# Patient Record
Sex: MALE | Race: WHITE | NOT HISPANIC OR LATINO
[De-identification: names, ages, dates, MRNs, and addresses within clinical notes are randomized per-mention and may not be internally consistent; named-entity substitution may affect disease eponyms.]

---

## 2020-10-02 ENCOUNTER — ERX REFILL RESPONSE (OUTPATIENT)
Age: 70
End: 2020-10-02

## 2020-10-02 RX ORDER — FAMOTIDINE 40 MG/1
TAKE 1 TABLET DAILY AT BEDTIME TABLET ORAL
Qty: 90 | Refills: 3

## 2021-02-18 ENCOUNTER — TELEPHONE ENCOUNTER (OUTPATIENT)
Dept: URBAN - METROPOLITAN AREA CLINIC 92 | Facility: CLINIC | Age: 71
End: 2021-02-18

## 2021-02-18 RX ORDER — OMEPRAZOLE 40 MG/1
TAKE 1 CAPSULE DAILY CAPSULE, DELAYED RELEASE PELLETS ORAL ONCE A DAY
Qty: 90 | Refills: 0
Start: 2020-04-28

## 2021-04-30 ENCOUNTER — OFFICE VISIT (OUTPATIENT)
Dept: URBAN - METROPOLITAN AREA TELEHEALTH 2 | Facility: TELEHEALTH | Age: 71
End: 2021-04-30
Payer: MEDICARE

## 2021-04-30 DIAGNOSIS — K21.00 GASTROESOPHAGEAL REFLUX DISEASE WITH ESOPHAGITIS WITHOUT HEMORRHAGE: ICD-10-CM

## 2021-04-30 PROCEDURE — 99213 OFFICE O/P EST LOW 20 MIN: CPT | Performed by: INTERNAL MEDICINE

## 2021-04-30 RX ORDER — OMEPRAZOLE 40 MG/1
TAKE 1 CAPSULE DAILY CAPSULE, DELAYED RELEASE PELLETS ORAL ONCE A DAY
Qty: 90 | Refills: 3
Start: 2020-04-28

## 2021-04-30 RX ORDER — FAMOTIDINE 40 MG/1
TAKE 1 TABLET DAILY AT BEDTIME TABLET ORAL
Qty: 90 | Refills: 3 | Status: ACTIVE | COMMUNITY

## 2021-04-30 RX ORDER — FAMOTIDINE 40 MG/1
TAKE 1 TABLET DAILY AT BEDTIME TABLET ORAL
Qty: 90 | Refills: 3

## 2021-04-30 RX ORDER — OMEPRAZOLE 40 MG/1
TAKE 1 CAPSULE DAILY CAPSULE, DELAYED RELEASE PELLETS ORAL ONCE A DAY
Qty: 90 | Refills: 0 | Status: ACTIVE | COMMUNITY
Start: 2020-04-28

## 2021-04-30 NOTE — HPI-TODAY'S VISIT:
71yo male who presents for annual OV for GERD.  EGD 5/2013 showed grade A esophagitis and gastritis, bx neg for H pylori and intestinal metaplasia. Repeat EGD 3/25/2016 showed changes suspicious for Gilmore's esophagus but bx/Wats did not show the presence of intestinal metaplasia. Reflux esophagitis was noted. The patient is on omeprazole 40mg/day and notes occasional heartburn at night or with pizza/heavy foods, improves with prn H2B. Rare regurgitation. No N/V, dysphagia or weight loss.  He had a colonoscopy 5/23/2016 which was normal. He has occasional constipation when diet is low in fiber. No diarrhea, hematochezia or melena. No family history of colon CA. Half-brother with crohn's and other with throat cancer--drinker and smoker

## 2021-07-01 ENCOUNTER — TELEPHONE ENCOUNTER (OUTPATIENT)
Dept: URBAN - METROPOLITAN AREA CLINIC 23 | Facility: CLINIC | Age: 71
End: 2021-07-01

## 2021-07-01 ENCOUNTER — OFFICE VISIT (OUTPATIENT)
Dept: URBAN - METROPOLITAN AREA SURGERY CENTER 16 | Facility: SURGERY CENTER | Age: 71
End: 2021-07-01

## 2021-07-01 RX ORDER — OMEPRAZOLE 40 MG/1
TAKE 1 CAPSULE DAILY CAPSULE, DELAYED RELEASE PELLETS ORAL ONCE A DAY
Qty: 90 | Refills: 3
Start: 2020-04-28

## 2021-07-26 ENCOUNTER — OFFICE VISIT (OUTPATIENT)
Dept: URBAN - METROPOLITAN AREA TELEHEALTH 2 | Facility: TELEHEALTH | Age: 71
End: 2021-07-26

## 2021-07-26 RX ORDER — OMEPRAZOLE 40 MG/1
TAKE 1 CAPSULE DAILY CAPSULE, DELAYED RELEASE PELLETS ORAL ONCE A DAY
Qty: 90 | Refills: 3

## 2021-07-26 RX ORDER — FAMOTIDINE 40 MG/1
TAKE 1 TABLET DAILY AT BEDTIME TABLET ORAL
Qty: 90 | Refills: 3

## 2021-07-29 ENCOUNTER — TELEPHONE ENCOUNTER (OUTPATIENT)
Dept: URBAN - METROPOLITAN AREA CLINIC 92 | Facility: CLINIC | Age: 71
End: 2021-07-29

## 2021-10-21 ENCOUNTER — TELEPHONE ENCOUNTER (OUTPATIENT)
Dept: URBAN - METROPOLITAN AREA CLINIC 40 | Facility: CLINIC | Age: 71
End: 2021-10-21

## 2021-10-26 ENCOUNTER — TELEPHONE ENCOUNTER (OUTPATIENT)
Dept: URBAN - METROPOLITAN AREA CLINIC 92 | Facility: CLINIC | Age: 71
End: 2021-10-26

## 2022-04-09 ENCOUNTER — ERX REFILL RESPONSE (OUTPATIENT)
Dept: URBAN - METROPOLITAN AREA CLINIC 92 | Facility: CLINIC | Age: 72
End: 2022-04-09

## 2022-04-09 RX ORDER — FAMOTIDINE 40 MG/1
TAKE 1 TABLET DAILY AT BEDTIME TABLET ORAL
Qty: 90 | Refills: 3 | OUTPATIENT

## 2022-04-09 RX ORDER — FAMOTIDINE 40 MG/1
TAKE 1 TABLET BY MOUTH AT BEDTIME TABLET, FILM COATED ORAL
Qty: 90 TABLET | Refills: 2 | OUTPATIENT

## 2022-08-12 ENCOUNTER — ERX REFILL RESPONSE (OUTPATIENT)
Dept: URBAN - METROPOLITAN AREA CLINIC 92 | Facility: CLINIC | Age: 72
End: 2022-08-12

## 2022-08-12 RX ORDER — OMEPRAZOLE 40 MG/1
TAKE 1 CAPSULE DAILY CAPSULE, DELAYED RELEASE PELLETS ORAL ONCE A DAY
Qty: 90 | Refills: 3 | OUTPATIENT

## 2022-08-12 RX ORDER — OMEPRAZOLE 40 MG/1
TAKE 1 CAPSULE BY MOUTH EVERY DAY CAPSULE, DELAYED RELEASE ORAL
Qty: 90 CAPSULE | Refills: 0 | OUTPATIENT

## 2022-10-19 ENCOUNTER — OFFICE VISIT (OUTPATIENT)
Dept: URBAN - METROPOLITAN AREA CLINIC 92 | Facility: CLINIC | Age: 72
End: 2022-10-19
Payer: MEDICARE

## 2022-10-19 VITALS
HEIGHT: 66 IN | HEART RATE: 64 BPM | BODY MASS INDEX: 33.91 KG/M2 | TEMPERATURE: 96.7 F | DIASTOLIC BLOOD PRESSURE: 81 MMHG | SYSTOLIC BLOOD PRESSURE: 138 MMHG | WEIGHT: 211 LBS

## 2022-10-19 DIAGNOSIS — K21.00 GASTROESOPHAGEAL REFLUX DISEASE WITH ESOPHAGITIS WITHOUT HEMORRHAGE: ICD-10-CM

## 2022-10-19 PROCEDURE — 99213 OFFICE O/P EST LOW 20 MIN: CPT | Performed by: PHYSICIAN ASSISTANT

## 2022-10-19 RX ORDER — FAMOTIDINE 40 MG/1
TAKE 1 TABLET BY MOUTH AT BEDTIME TABLET, FILM COATED ORAL
Qty: 90 TABLET | Refills: 2 | Status: ACTIVE | COMMUNITY

## 2022-10-19 RX ORDER — OMEPRAZOLE 40 MG/1
TAKE 1 CAPSULE BY MOUTH EVERY DAY CAPSULE, DELAYED RELEASE ORAL
Qty: 90 CAPSULE | Refills: 0 | Status: ACTIVE | COMMUNITY

## 2022-10-19 RX ORDER — FAMOTIDINE 40 MG/1
TAKE 1 TABLET DAILY AT BEDTIME TABLET ORAL
Qty: 90 | Refills: 3

## 2022-10-19 RX ORDER — OMEPRAZOLE 40 MG/1
TAKE 1 CAPSULE DAILY CAPSULE, DELAYED RELEASE PELLETS ORAL ONCE A DAY
Qty: 90 | Refills: 3

## 2022-10-19 NOTE — PHYSICAL EXAM MUSCULOSKELETAL:
[facility] Daily Progress Note    SUBJECTIVE     Subjective:     Patient is alert, lying in bed, NAD. She denies feeling short of breath while at rest, and with ambulation to the bathroom, but states walking any further causes her to become dyspneic and so far has not ambulated farther than the bathroom while here. She still endorses a cough, stating she now has become productive. She denies fever, chest pain, nausea, vomiting, or abdominal pain. She has history of asthma and reports has not had an exacerbation in a \"long time\" and has not needed steroids in the past 6 months. Patient states she has a history of iron deficiency and has not taken iron supplements in a long time. She is in between PCPs at present due to insurance. She denies having a colonoscopy done since turning 50.     MEDICATIONS     Current Facility-Administered Medications   Medication   • ferrous sulfate (65 mg Fe per 325 mg) tablet 325 mg   • methylPREDNISolone (SOLU-Medrol) PF injection 40 mg   • ipratropium-albuterol (DUONEB) 0.5-2.5 (3) MG/3ML nebulizer solution 3 mL   • benzonatate (TESSALON PERLES) capsule 100 mg   • dextrose 50 % injection 25 g   • dextrose 50 % injection 12.5 g   • dextrose 5 % infusion   • glucagon (GLUCAGEN) injection 1 mg   • dextrose (GLUTOSE) 40 % gel 15 g   • dextrose (GLUTOSE) 40 % gel 30 g   • insulin lispro (HumaLOG) scheduled dose AND correction dose   • sodium chloride 0.9 % flush bag 25 mL   • sodium chloride (PF) 0.9 % injection 2 mL   • sodium chloride 0.9% infusion   • albuterol inhaler 2 puff   • cholecalciferol (VITAMIN D) tablet 25 mcg   • metFORMIN (GLUCOPHAGE) tablet 500 mg   • fluticasone-vilanterol (BREO ELLIPTA) 100-25 MCG/INH inhaler 1 puff   • zinc sulfate (ZINCATE) capsule 220 mg   • glucagon (GLUCAGEN) injection 1 mg   • dextrose (GLUTOSE) 40 % gel 15 g       OBJECTIVE     I&Os    Intake/Output Summary (Last 24 hours) at 8/20/2020 9169  Last data filed at 8/20/2020 1130  Gross per 24 hour   Intake  normal gait and station , no tenderness or deformities present 230 ml   Output 600 ml   Net -370 ml       Last Recorded Vitals  Vital Last Value 24 Hour Range   Temperature 98.6 °F (37 °C) (08/20/20 1034) Temp  Min: 97.5 °F (36.4 °C)  Max: 98.6 °F (37 °C)   Pulse 79 (08/20/20 1034) Pulse  Min: 70  Max: 105   Respiratory 16 (08/20/20 1034) Resp  Min: 16  Max: 20   Non-Invasive  Blood Pressure 126/81 (08/20/20 1034) BP  Min: 122/67  Max: 155/63   Pulse Oximetry 94 % (08/20/20 1034) SpO2  Min: 93 %  Max: 98 %     Vital Today Admitted   Weight 126.1 kg (278 lb) (08/19/20 1953) Weight: 124.7 kg (274 lb 14.6 oz) (08/19/20 1544)   Height N/A Height: 5' 5\" (165.1 cm) (08/19/20 1953)   BMI N/A BMI (Calculated): 46.26 (08/19/20 1953)     PHYSICAL EXAMINATION     height is 5' 5\" (1.651 m) and weight is 126.1 kg (278 lb). Her oral temperature is 98.6 °F (37 °C). Her blood pressure is 126/81 and her pulse is 79. Her respiration is 16 and oxygen saturation is 94%.     Physical Exam  Constitutional:       Appearance: Normal appearance.   HENT:      Head: Normocephalic.   Cardiovascular:      Rate and Rhythm: Normal rate and regular rhythm.      Pulses: Normal pulses.      Heart sounds: Normal heart sounds. No murmur.   Pulmonary:      Breath sounds: Examination of the right-lower field reveals decreased breath sounds. Examination of the left-lower field reveals decreased breath sounds and wheezing. Decreased breath sounds and wheezing present.      Comments: No JVF=D  Abdominal:      General: Bowel sounds are normal.      Palpations: Abdomen is soft.   Musculoskeletal:      Right lower leg: No edema.      Left lower leg: No edema.   Skin:     General: Skin is warm and dry.      Capillary Refill: Capillary refill takes less than 2 seconds.   Neurological:      General: No focal deficit present.      Mental Status: She is alert and oriented to person, place, and time.      Cranial Nerves: No cranial nerve deficit.      Sensory: No sensory deficit.   Psychiatric:         Mood and Affect: Mood  normal.         REVIEW OF LABORATORY DATA  I have reviewed the following:    Recent Labs   Lab 08/20/20  0538 08/19/20  1558   WBC 11.0 10.5   HCT 30.2* 29.2*   HGB 8.4* 8.0*    248   INR  --  1.0   PTT  --  25   SODIUM 138 137   POTASSIUM 3.8 4.3   CHLORIDE 110* 110*   CO2 22 20*   CALCIUM 8.9 8.8   GLUCOSE 186* 246*   BUN 17 13   CREATININE 0.48* 0.54   AST 16 23   GPT 25 30   ALKPT 109 131*   BILIRUBIN 0.7 0.3   ALBUMIN 3.1* 3.4*   GFRNA >90 >90       STUDIES     CT CHEST WO CONTRAST  Narrative: EXAM: CT CHEST WO CONTRAST    CLINICAL INDICATION: shortness of breath, cough    COMPARISON: Chest x-ray 08/19/2020, there    TECHNIQUE: Routine helical acquisitions were obtained through the chest without administration of intravenous contrast material in standard protocol. Coronal and sagittal reformats were produced.  Automated dose reduction techniques utilized.    FINDINGS: No focal pulmonary parenchymal mass or suspicious consolidation.  Small amount of atelectasis and minimal linear scarring noted at the lung bases.  There are a few tiny 1 mm additional 1 mm noncalcified nodule is noted in the posterior right   upper lobe best seen on series 603 image 58.  Peribronchial vascular nodules in the posterior left lower lobe best seen on series 603 image 95.  Slight mosaic attenuation is noted throughout both lungs suggesting mild air trapping.  No evidence of   emphysema.    No pleural or pericardial effusions.  No significant pathologic lymph node enlargement is identified within the mediastinum, peng, or axillary regions.    There are postoperative changes of the gastroesophageal junction with small hiatal hernia.  Partially visualized contracted gallbladder with small amount of cholelithiasis noted.  No wall edema or pericholecystic inflammatory changes.    No worrisome lytic or sclerotic osseous lesions are noted.  Degenerative changes and slight scoliosis of the spine are present.  Impression: 1.   A few  tiny 1 mm noncalcified pulmonary nodules with no suspicious masses or consolidation identified.    2.   No significant focal acute or suspicious intrathoracic abnormalities.    3.   Small hiatal hernia.  4.   Uncomplicated cholelithiasis.    Electronically Signed by: BERE KENNEDY M.D.   Signed on: 8/20/2020 12:32 PM        ASSESSMENT and PLAN     Acute dyspnea, possibly related to acute asthma exacerbation  -With possible component of anemia contributing to symptoms also  -CXR (8/19) negative  -CT chest (8/20) negative for acute findings  -Start IV solumedrol  -Continue breo ellipta  -Supportive care with prn duonebs, prn tylenol, prn zofran    Acute on chronic iron deficiency  -Hgb 8.0-->8.4 (baseline ~9-11) s/p 1U PRBC transfusion  -Monitor    Diabetes mellitus type 2  -Continue metformin  -SSI for coverage; monitor accucheks    Obese (BMI 46.26)- dietary and lifestyle modifications recommended when stable    DVT PPx: SCD, lovenox    Disposition: pending clinical improvement    PCP: Rickey Lopez MD    Care overseen and managed with Dr. Zach Solorzano CNP  [facility]

## 2022-10-19 NOTE — HPI-TODAY'S VISIT:
73yo male who presents for annual OV for GERD.  EGD 5/2013 showed grade A esophagitis and gastritis, bx neg for H pylori and intestinal metaplasia. Repeat EGD 3/25/2016 showed changes suspicious for Gilmore's esophagus but bx/Wats did not show the presence of intestinal metaplasia. Reflux esophagitis was noted. The patient is on omeprazole 40mg/day and notes occasional heartburn/regurg at night with pizza/heavy foods or ice cream, improved with prn H2B. No N/V, dysphagia or weight loss.  He had a colonoscopy 5/23/2016 which was normal. He has rare looser stools but no constipation, diarrhea, hematochezia or melena. No family history of colon CA. Half-brother with crohn's and other with throat cancer--drinker and smoke

## 2022-11-10 ENCOUNTER — ERX REFILL RESPONSE (OUTPATIENT)
Dept: URBAN - METROPOLITAN AREA CLINIC 92 | Facility: CLINIC | Age: 72
End: 2022-11-10

## 2022-11-10 RX ORDER — OMEPRAZOLE 40 MG/1
TAKE 1 CAPSULE BY MOUTH EVERY DAY CAPSULE, DELAYED RELEASE ORAL
Qty: 90 CAPSULE | Refills: 0 | OUTPATIENT

## 2022-11-10 RX ORDER — OMEPRAZOLE 40 MG/1
TAKE 1 CAPSULE BY MOUTH EVERY DAY CAPSULE, DELAYED RELEASE ORAL
Qty: 90 CAPSULE | Refills: 2 | OUTPATIENT

## 2023-08-21 ENCOUNTER — TELEPHONE ENCOUNTER (OUTPATIENT)
Dept: URBAN - METROPOLITAN AREA CLINIC 92 | Facility: CLINIC | Age: 73
End: 2023-08-21

## 2023-08-21 ENCOUNTER — ERX REFILL RESPONSE (OUTPATIENT)
Dept: URBAN - METROPOLITAN AREA CLINIC 92 | Facility: CLINIC | Age: 73
End: 2023-08-21

## 2023-08-21 RX ORDER — OMEPRAZOLE 40 MG/1
TAKE 1 CAPSULE BY MOUTH EVERY DAY CAPSULE, DELAYED RELEASE ORAL
Qty: 90 CAPSULE | Refills: 0 | OUTPATIENT

## 2023-08-21 RX ORDER — OMEPRAZOLE 40 MG/1
TAKE 1 CAPSULE BY MOUTH EVERY DAY CAPSULE, DELAYED RELEASE ORAL
Qty: 90 CAPSULE | Refills: 2 | OUTPATIENT

## 2023-08-31 ENCOUNTER — ERX REFILL RESPONSE (OUTPATIENT)
Dept: URBAN - METROPOLITAN AREA CLINIC 92 | Facility: CLINIC | Age: 73
End: 2023-08-31

## 2023-08-31 RX ORDER — OMEPRAZOLE 40 MG/1
TAKE 1 CAPSULE BY MOUTH EVERY DAY CAPSULE, DELAYED RELEASE ORAL
Qty: 90 CAPSULE | Refills: 0 | OUTPATIENT

## 2023-10-20 ENCOUNTER — DASHBOARD ENCOUNTERS (OUTPATIENT)
Age: 73
End: 2023-10-20

## 2023-10-26 ENCOUNTER — CLAIMS CREATED FROM THE CLAIM WINDOW (OUTPATIENT)
Dept: URBAN - METROPOLITAN AREA TELEHEALTH 2 | Facility: TELEHEALTH | Age: 73
End: 2023-10-26
Payer: MEDICARE

## 2023-10-26 ENCOUNTER — TELEPHONE ENCOUNTER (OUTPATIENT)
Dept: URBAN - METROPOLITAN AREA CLINIC 92 | Facility: CLINIC | Age: 73
End: 2023-10-26

## 2023-10-26 VITALS — WEIGHT: 211 LBS | BODY MASS INDEX: 33.91 KG/M2 | HEIGHT: 66 IN

## 2023-10-26 DIAGNOSIS — K21.00 GASTROESOPHAGEAL REFLUX DISEASE WITH ESOPHAGITIS WITHOUT HEMORRHAGE: ICD-10-CM

## 2023-10-26 PROCEDURE — 99213 OFFICE O/P EST LOW 20 MIN: CPT | Performed by: PHYSICIAN ASSISTANT

## 2023-10-26 RX ORDER — FAMOTIDINE 40 MG/1
TAKE 1 TABLET BY MOUTH AT BEDTIME TABLET, FILM COATED ORAL
Qty: 90 TABLET | Refills: 2 | Status: ACTIVE | COMMUNITY

## 2023-10-26 RX ORDER — OMEPRAZOLE 40 MG/1
TAKE 1 CAPSULE DAILY CAPSULE, DELAYED RELEASE PELLETS ORAL ONCE A DAY
Qty: 90 | Refills: 3

## 2023-10-26 RX ORDER — FAMOTIDINE 40 MG/1
TAKE 1 TABLET DAILY AT BEDTIME TABLET ORAL
Qty: 90 | Refills: 3

## 2023-10-26 RX ORDER — FAMOTIDINE 40 MG/1
TAKE 1 TABLET DAILY AT BEDTIME TABLET ORAL
Qty: 90 | Refills: 3 | Status: ACTIVE | COMMUNITY

## 2023-10-26 RX ORDER — OMEPRAZOLE 40 MG/1
TAKE 1 CAPSULE BY MOUTH EVERY DAY CAPSULE, DELAYED RELEASE ORAL
Qty: 90 CAPSULE | Refills: 0 | Status: ACTIVE | COMMUNITY

## 2023-10-26 NOTE — HPI-TODAY'S VISIT:
74yo male who presents for annual OV for GERD.  EGD 5/2013 showed grade A esophagitis and gastritis, bx neg for H pylori and intestinal metaplasia. Repeat EGD 3/25/2016 showed changes suspicious for Gilmore's esophagus but bx/Wats did not show the presence of intestinal metaplasia. Reflux esophagitis was noted. The patient is on omeprazole 40mg/day and notes occasional heartburn/regurg at night with pizza/heavy foods or ice cream, improved with prn H2B. No N/V, dysphagia or weight loss. Occasionally feels like he chokes on saliva. He had a colonoscopy 5/23/2016 which was normal. He denies constipation, diarrhea, hematochezia or melena. No family history of colon CA. Half-brother with crohn's and other with throat cancer--drinker and smoker Recently moved to [place] full time there now

## 2025-03-31 ENCOUNTER — TELEPHONE ENCOUNTER (OUTPATIENT)
Dept: URBAN - METROPOLITAN AREA CLINIC 94 | Facility: CLINIC | Age: 75
End: 2025-03-31